# Patient Record
Sex: FEMALE | URBAN - METROPOLITAN AREA
[De-identification: names, ages, dates, MRNs, and addresses within clinical notes are randomized per-mention and may not be internally consistent; named-entity substitution may affect disease eponyms.]

---

## 2021-02-08 ENCOUNTER — OFFICE VISIT (OUTPATIENT)
Dept: URBAN - METROPOLITAN AREA CLINIC 81 | Facility: CLINIC | Age: 31
End: 2021-02-08
Payer: COMMERCIAL

## 2021-02-08 DIAGNOSIS — H53.022 REFRACTIVE AMBLYOPIA OF LEFT EYE: Chronic | ICD-10-CM

## 2021-02-08 DIAGNOSIS — H52.222 REGULAR ASTIGMATISM, LEFT EYE: Primary | Chronic | ICD-10-CM

## 2021-02-08 PROCEDURE — 92002 INTRM OPH EXAM NEW PATIENT: CPT | Performed by: OPTOMETRIST

## 2021-02-08 ASSESSMENT — INTRAOCULAR PRESSURE
OD: 14
OS: 14

## 2021-02-08 ASSESSMENT — KERATOMETRY
OD: 44.25
OS: 44.50

## 2021-02-08 ASSESSMENT — VISUAL ACUITY
OS: 20/25
OD: 20/20

## 2021-02-08 NOTE — IMPRESSION/PLAN
Impression: Regular astigmatism, left eye: H52.222. Plan: Dispensed Rx for glasses to patient and instructed on normal adaptation period.

## 2021-02-08 NOTE — IMPRESSION/PLAN
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 Plan: Discussed diagnosis with patient in detail. No history of vision therapy. Will continue to observe condition and/or symptoms. Patient instructed to call if condition gets worse.